# Patient Record
Sex: MALE | Race: WHITE | Employment: FULL TIME | URBAN - METROPOLITAN AREA
[De-identification: names, ages, dates, MRNs, and addresses within clinical notes are randomized per-mention and may not be internally consistent; named-entity substitution may affect disease eponyms.]

---

## 2017-06-21 ENCOUNTER — HOSPITAL ENCOUNTER (OUTPATIENT)
Dept: GENERAL RADIOLOGY | Facility: HOSPITAL | Age: 42
Discharge: HOME | End: 2017-06-21
Attending: FAMILY MEDICINE
Payer: COMMERCIAL

## 2017-06-21 ENCOUNTER — OFFICE VISIT (OUTPATIENT)
Dept: FAMILY MEDICINE CLINIC | Facility: CLINIC | Age: 42
End: 2017-06-21

## 2017-06-21 VITALS
SYSTOLIC BLOOD PRESSURE: 133 MMHG | DIASTOLIC BLOOD PRESSURE: 87 MMHG | HEART RATE: 81 BPM | WEIGHT: 194 LBS | TEMPERATURE: 98 F | HEIGHT: 66 IN | BODY MASS INDEX: 31.18 KG/M2

## 2017-06-21 DIAGNOSIS — M25.522 LEFT ELBOW PAIN: ICD-10-CM

## 2017-06-21 DIAGNOSIS — Z00.00 WELL ADULT EXAM: ICD-10-CM

## 2017-06-21 DIAGNOSIS — M79.672 LEFT FOOT PAIN: ICD-10-CM

## 2017-06-21 DIAGNOSIS — M79.672 LEFT FOOT PAIN: Primary | ICD-10-CM

## 2017-06-21 PROCEDURE — 99203 OFFICE O/P NEW LOW 30 MIN: CPT | Performed by: FAMILY MEDICINE

## 2017-06-21 PROCEDURE — 99212 OFFICE O/P EST SF 10 MIN: CPT | Performed by: FAMILY MEDICINE

## 2017-06-21 PROCEDURE — 73620 X-RAY EXAM OF FOOT: CPT | Performed by: FAMILY MEDICINE

## 2017-06-22 ENCOUNTER — LAB ENCOUNTER (OUTPATIENT)
Dept: LAB | Facility: HOSPITAL | Age: 42
End: 2017-06-22
Attending: FAMILY MEDICINE
Payer: COMMERCIAL

## 2017-06-22 DIAGNOSIS — Z00.00 WELL ADULT EXAM: ICD-10-CM

## 2017-06-22 DIAGNOSIS — M79.672 LEFT FOOT PAIN: ICD-10-CM

## 2017-06-22 PROCEDURE — 36415 COLL VENOUS BLD VENIPUNCTURE: CPT

## 2017-06-22 PROCEDURE — 85652 RBC SED RATE AUTOMATED: CPT

## 2017-06-22 PROCEDURE — 80048 BASIC METABOLIC PNL TOTAL CA: CPT

## 2017-06-22 PROCEDURE — 82306 VITAMIN D 25 HYDROXY: CPT

## 2017-06-22 PROCEDURE — 84460 ALANINE AMINO (ALT) (SGPT): CPT

## 2017-06-22 PROCEDURE — 84450 TRANSFERASE (AST) (SGOT): CPT

## 2017-06-22 PROCEDURE — 84550 ASSAY OF BLOOD/URIC ACID: CPT

## 2017-06-22 PROCEDURE — 84443 ASSAY THYROID STIM HORMONE: CPT

## 2017-06-22 PROCEDURE — 82607 VITAMIN B-12: CPT

## 2017-06-22 PROCEDURE — 80061 LIPID PANEL: CPT

## 2017-06-22 PROCEDURE — 85025 COMPLETE CBC W/AUTO DIFF WBC: CPT

## 2017-06-22 NOTE — PROGRESS NOTES
HPI:    Patient ID: Hugo Boothe is a 37year old male. HPI     PATIENT HERE NEW TO ME WITH C/O   Patient presents with:   Foot Pain: New Pt c/o LT foot pain x 2 months, pt noted bruising on the bottom   Elbow Pain: Pt c/o LT elbow pain   c/o left foot (Automated) [E]; Future  - XR FOOT (2 VIEW), LEFT (CPT=73620); Future  - Podiatry Referral - Dr. Precious Singh    2. Left elbow pain  Suspect bursitis mild/ inflammation  Aleve, avoid resting elbows on table    3.  Well adult exam  Return yearly for physicals  [facility]

## 2017-06-24 ENCOUNTER — TELEPHONE (OUTPATIENT)
Dept: FAMILY MEDICINE CLINIC | Facility: CLINIC | Age: 42
End: 2017-06-24

## 2017-06-24 NOTE — TELEPHONE ENCOUNTER
----- Message from Almita Zendejas MD sent at 6/23/2017  4:07 PM CDT -----  Foot xray normal  Please see podiatry  pls call patient.

## 2017-06-24 NOTE — TELEPHONE ENCOUNTER
Pt returned call (Name and  verified) and MD result message relayed to pt. Pt provided with Dr. Aaron Jeffrey (podiatry) contact information to call for an appt.     Pt verbalizes understanding, expresses intent to comply, denies any further questions or c

## 2017-06-27 ENCOUNTER — OFFICE VISIT (OUTPATIENT)
Dept: PODIATRY CLINIC | Facility: CLINIC | Age: 42
End: 2017-06-27

## 2017-06-27 DIAGNOSIS — M72.2 PLANTAR FASCIITIS OF LEFT FOOT: Primary | ICD-10-CM

## 2017-06-27 PROCEDURE — L3060 FOOT ARCH SUPP LONGITUD/META: HCPCS | Performed by: PODIATRIST

## 2017-06-27 PROCEDURE — 99212 OFFICE O/P EST SF 10 MIN: CPT | Performed by: PODIATRIST

## 2017-06-27 PROCEDURE — 99243 OFF/OP CNSLTJ NEW/EST LOW 30: CPT | Performed by: PODIATRIST

## 2017-06-27 NOTE — PROGRESS NOTES
HPI:    Patient ID: Nehemias Justice is a 37year old male. HPI  This pleasant 56-year-old male presents as a new patient to me on consult from . Patient's chief complaint is arch and heel pain on the left foot.   History indicates that although he (primary encounter diagnosis)    No orders of the defined types were placed in this encounter.       Meds This Visit:  No prescriptions requested or ordered in this encounter    Imaging & Referrals:  None       #[id number]

## 2017-07-14 ENCOUNTER — TELEPHONE (OUTPATIENT)
Dept: PODIATRY CLINIC | Facility: CLINIC | Age: 42
End: 2017-07-14

## 2019-01-17 ENCOUNTER — OFFICE VISIT (OUTPATIENT)
Dept: FAMILY MEDICINE CLINIC | Facility: CLINIC | Age: 44
End: 2019-01-17
Payer: COMMERCIAL

## 2019-01-17 VITALS
SYSTOLIC BLOOD PRESSURE: 129 MMHG | TEMPERATURE: 98 F | HEART RATE: 88 BPM | BODY MASS INDEX: 30.05 KG/M2 | HEIGHT: 66 IN | DIASTOLIC BLOOD PRESSURE: 74 MMHG | WEIGHT: 187 LBS

## 2019-01-17 DIAGNOSIS — H61.21 IMPACTED CERUMEN OF RIGHT EAR: Primary | ICD-10-CM

## 2019-01-17 PROCEDURE — 69209 REMOVE IMPACTED EAR WAX UNI: CPT | Performed by: FAMILY MEDICINE

## 2019-01-17 PROCEDURE — 99213 OFFICE O/P EST LOW 20 MIN: CPT | Performed by: FAMILY MEDICINE

## 2019-01-17 PROCEDURE — 99212 OFFICE O/P EST SF 10 MIN: CPT | Performed by: FAMILY MEDICINE

## 2019-01-17 NOTE — PROGRESS NOTES
HPI:    Patient ID: David Nobles is a 44year old male. HPI     FEELS RIGHT EAR CLOGGED X FEW DAYS  NO PAIN, NO DISCHARGE    NO FEVERS  USING Q TIPS. NO SWIMMING    Review of Systems   Constitutional: Negative for chills and fever.    HENT: Positive fo

## 2021-06-16 ENCOUNTER — TELEPHONE (OUTPATIENT)
Dept: INTERNAL MEDICINE CLINIC | Facility: CLINIC | Age: 46
End: 2021-06-16

## (undated) NOTE — MR AVS SNAPSHOT
[facility] SPECIALTY [facility] - [address]  [id number] [phone]               Thank you for choosing us for your health care visit with Shanell Silvestre MD.  We are glad to serve you and happy to provide you with this summary of yo Assoc Dx: Left foot pain [M79.672]           Podiatry Referral - Dr. Franca Perdomo    Complete by:  As directed    Assoc Dx:   Left foot pain [M79.672]                 Scheduling Instructions     Wednesday June 21, 2017     Imaging:  XR FOOT (2 VIEW), LEFT (CPT= [address] [place] [address]    Eveline R[address]  [address] [place]    It is the patient's responsibility to check with and follow their insurance company's guidelines for prior authorization Instructions and Information about Your Health     None      Allergies as of Jun 21, 2017     No Known Allergies                Today's Vital Signs     BP Pulse Temp Height Weight BMI    151/77 mmHg 98 98.2 °F (36.8 °C) (Oral) 5' 6\" (1.676 m) 194 lb (87.9 walking, light jogging, cycling, swimming, etc.) for a goal of at least 150 minutes per week. Moderation of alcohol consumption Men: limit to <= 2 drinks* per day. Women and lighter weight persons: limit to <= 1 drink* per day.                       Heal